# Patient Record
Sex: MALE | Race: WHITE
[De-identification: names, ages, dates, MRNs, and addresses within clinical notes are randomized per-mention and may not be internally consistent; named-entity substitution may affect disease eponyms.]

---

## 2019-06-01 PROBLEM — Z00.00 ENCOUNTER FOR PREVENTIVE HEALTH EXAMINATION: Status: ACTIVE | Noted: 2019-06-01

## 2019-06-06 ENCOUNTER — RECORD ABSTRACTING (OUTPATIENT)
Age: 49
End: 2019-06-06

## 2019-06-06 DIAGNOSIS — Z78.9 OTHER SPECIFIED HEALTH STATUS: ICD-10-CM

## 2019-06-06 DIAGNOSIS — Z83.79 FAMILY HISTORY OF OTHER DISEASES OF THE DIGESTIVE SYSTEM: ICD-10-CM

## 2019-06-06 DIAGNOSIS — Z83.1 FAMILY HISTORY OF OTHER INFECTIOUS AND PARASITIC DISEASES: ICD-10-CM

## 2019-06-06 RX ORDER — MULTIVITAMIN
TABLET ORAL
Refills: 0 | Status: ACTIVE | COMMUNITY

## 2019-06-26 ENCOUNTER — APPOINTMENT (OUTPATIENT)
Dept: GASTROENTEROLOGY | Facility: CLINIC | Age: 49
End: 2019-06-26
Payer: COMMERCIAL

## 2019-06-26 VITALS
WEIGHT: 228 LBS | SYSTOLIC BLOOD PRESSURE: 148 MMHG | BODY MASS INDEX: 33.77 KG/M2 | DIASTOLIC BLOOD PRESSURE: 100 MMHG | HEIGHT: 69 IN | HEART RATE: 81 BPM

## 2019-06-26 DIAGNOSIS — Z83.1 FAMILY HISTORY OF OTHER INFECTIOUS AND PARASITIC DISEASES: ICD-10-CM

## 2019-06-26 DIAGNOSIS — Z78.9 OTHER SPECIFIED HEALTH STATUS: ICD-10-CM

## 2019-06-26 PROCEDURE — 99214 OFFICE O/P EST MOD 30 MIN: CPT

## 2019-06-26 NOTE — CONSULT LETTER
[Dear  ___] : Dear  [unfilled], [Consult Letter:] : I had the pleasure of evaluating your patient, [unfilled]. [Please see my note below.] : Please see my note below. [Consult Closing:] : Thank you very much for allowing me to participate in the care of this patient.  If you have any questions, please do not hesitate to contact me. [Sincerely,] : Sincerely, [FreeTextEntry3] : Glen Gresham MD

## 2019-06-26 NOTE — REVIEW OF SYSTEMS
[Loss Of Hearing] : hearing loss [As Noted in HPI] : as noted in HPI [Negative] : Heme/Lymph [FreeTextEntry2] : nigh sweat [FreeTextEntry8] : frequent urination [FreeTextEntry3] : glasses

## 2019-06-26 NOTE — HISTORY OF PRESENT ILLNESS
[de-identified] : 48 year old male who I have seen in the past for dysphagia, heartburn and constipation\par Patient reports heartburn after dietary indiscretion (onion rings, french fries). This occurs approximately once a month. No dysphagia or odynophagia. \par Constipation has not been an issue for several years.\par \par The patient denies rectal bleeding, melena, diarrhea, constipation, change in bowel habits, change in stool caliber, weight loss,change in appetite, nausea, vomiting, difficulty swallowing, early satiety, abdominal pain, fever or chills.\par

## 2019-06-26 NOTE — ASSESSMENT
[FreeTextEntry1] : 48 year old male\par 1) Heartburn:\par  I educated the patient regarding lifestyle modification. \par I refilled prilosec 20mg daily\par Risk of long term proton pump inhibitors discussed with patient. The patient was advised to follow up with their primary care physician regarding bone density scans. I advised the patient to take calcium and vitamin D supplementation.\par \par 2) Constipation\par controlled\par high fiber diet\par \par 3) colorectal cancer screening\par Colonoscopy at age 50\par \par OV in 1 year

## 2019-07-01 ENCOUNTER — APPOINTMENT (OUTPATIENT)
Dept: GASTROENTEROLOGY | Facility: CLINIC | Age: 49
End: 2019-07-01

## 2021-01-05 ENCOUNTER — APPOINTMENT (OUTPATIENT)
Dept: GASTROENTEROLOGY | Facility: CLINIC | Age: 51
End: 2021-01-05
Payer: COMMERCIAL

## 2021-01-05 ENCOUNTER — TRANSCRIPTION ENCOUNTER (OUTPATIENT)
Age: 51
End: 2021-01-05

## 2021-01-05 DIAGNOSIS — Z12.11 ENCOUNTER FOR SCREENING FOR MALIGNANT NEOPLASM OF COLON: ICD-10-CM

## 2021-01-05 DIAGNOSIS — K21.9 GASTRO-ESOPHAGEAL REFLUX DISEASE W/OUT ESOPHAGITIS: ICD-10-CM

## 2021-01-05 DIAGNOSIS — Z12.12 ENCOUNTER FOR SCREENING FOR MALIGNANT NEOPLASM OF COLON: ICD-10-CM

## 2021-01-05 DIAGNOSIS — Z87.19 PERSONAL HISTORY OF OTHER DISEASES OF THE DIGESTIVE SYSTEM: ICD-10-CM

## 2021-01-05 PROCEDURE — 99443: CPT

## 2021-01-05 RX ORDER — OMEPRAZOLE 20 MG/1
20 TABLET, DELAYED RELEASE ORAL
Qty: 90 | Refills: 3 | Status: DISCONTINUED | COMMUNITY
End: 2021-01-05

## 2021-01-05 RX ORDER — ALPRAZOLAM 0.5 MG/1
0.5 TABLET ORAL
Qty: 30 | Refills: 0 | Status: DISCONTINUED | COMMUNITY
Start: 2019-06-19 | End: 2021-01-05

## 2021-01-05 RX ORDER — OMEPRAZOLE 40 MG/1
40 CAPSULE, DELAYED RELEASE ORAL
Qty: 1 | Refills: 3 | Status: ACTIVE | COMMUNITY
Start: 2020-02-11 | End: 1900-01-01

## 2021-01-05 NOTE — REVIEW OF SYSTEMS
[As Noted in HPI] : as noted in HPI [Fever] : no fever [Eye Pain] : no eye pain [Earache] : no earache [Heart Rate Is Slow] : the heart rate was not slow [Shortness Of Breath] : no shortness of breath [Arthralgias] : no arthralgias [Skin Lesions] : no skin lesions [Convulsions] : no convulsions [Easy Bleeding] : no tendency for easy bleeding

## 2021-01-05 NOTE — HISTORY OF PRESENT ILLNESS
[Home] : at home, [unfilled] , at the time of the visit. [Other Location: e.g. Home (Enter Location, City,State)___] : at [unfilled] [Verbal consent obtained from patient] : the patient, [unfilled] [FreeTextEntry4] : Ruben Fletcher MA [de-identified] : 50 year old male who I have seen in the past for dysphagia, heartburn and constipation\par At his visit in June 2019, the patient reported heartburn after dietary indiscretion (onion rings, french fries). This occurred approximately once a month. No dysphagia or odynophagia. \par Constipation has not been an issue for several years.\par \par He can have symptoms when he doesn't eat, he will experience dyspepsia. Overall though his symptoms are controlled.\par \par The patient denies rectal bleeding, melena, diarrhea, constipation, change in bowel habits, change in stool caliber, weight loss,change in appetite, nausea, vomiting, difficulty swallowing, early satiety, abdominal pain, fever or chills.

## 2021-01-05 NOTE — ASSESSMENT
[FreeTextEntry1] : 50 year old male\par \par 1) Heartburn:\par  I educated the patient regarding lifestyle modification. \par I refilled prilosec 20mg daily\par Risk of long term proton pump inhibitors discussed with patient. The patient was advised to follow up with their primary care physician regarding bone density scans. I advised the patient to take calcium and vitamin D supplementation.\par \par 2) Constipation\par controlled\par high fiber diet\par \par 3) colorectal cancer screening\par The patient will benefit from a colonoscopy. Risks, benefits, indications and alternatives discussed with patient who was amenable.\par \par The patient was given written instructions regarding the preparation. The day before the procedure only clear  liquids can be ingested. The patient will mix 238 grams of MiraLax mixed with 64 ounces gatorade. The patient was instructed to drink half  the mixture beginning at 5 pm the day before the colonoscopy and complete it before midnight. The other half should be ingested the morning of the procedure (making sure to complete it at least 2 hours before the scheduled procedure time).  The patient was also advised to take 20 mg oral Dulcolax pills the evening before the procedure. Nothing should be eaten after midnight. The patient was told how to manage their medications the day before and the day of the procedure.\par \par

## 2021-01-17 ENCOUNTER — OUTPATIENT (OUTPATIENT)
Dept: OUTPATIENT SERVICES | Facility: HOSPITAL | Age: 51
LOS: 1 days | Discharge: HOME | End: 2021-01-17

## 2021-01-17 ENCOUNTER — LABORATORY RESULT (OUTPATIENT)
Age: 51
End: 2021-01-17

## 2021-01-17 DIAGNOSIS — Z11.59 ENCOUNTER FOR SCREENING FOR OTHER VIRAL DISEASES: ICD-10-CM

## 2021-01-20 ENCOUNTER — TRANSCRIPTION ENCOUNTER (OUTPATIENT)
Age: 51
End: 2021-01-20

## 2021-01-20 ENCOUNTER — OUTPATIENT (OUTPATIENT)
Dept: OUTPATIENT SERVICES | Facility: HOSPITAL | Age: 51
LOS: 1 days | Discharge: HOME | End: 2021-01-20
Payer: COMMERCIAL

## 2021-01-20 ENCOUNTER — RESULT REVIEW (OUTPATIENT)
Age: 51
End: 2021-01-20

## 2021-01-20 VITALS
TEMPERATURE: 98 F | RESPIRATION RATE: 18 BRPM | HEIGHT: 69 IN | SYSTOLIC BLOOD PRESSURE: 149 MMHG | DIASTOLIC BLOOD PRESSURE: 95 MMHG | WEIGHT: 220.02 LBS

## 2021-01-20 VITALS — SYSTOLIC BLOOD PRESSURE: 117 MMHG | HEART RATE: 86 BPM | DIASTOLIC BLOOD PRESSURE: 69 MMHG | RESPIRATION RATE: 18 BRPM

## 2021-01-20 DIAGNOSIS — Z90.49 ACQUIRED ABSENCE OF OTHER SPECIFIED PARTS OF DIGESTIVE TRACT: Chronic | ICD-10-CM

## 2021-01-20 DIAGNOSIS — Z90.89 ACQUIRED ABSENCE OF OTHER ORGANS: Chronic | ICD-10-CM

## 2021-01-20 PROCEDURE — 88305 TISSUE EXAM BY PATHOLOGIST: CPT | Mod: 26

## 2021-01-20 PROCEDURE — 45380 COLONOSCOPY AND BIOPSY: CPT

## 2021-01-20 RX ORDER — OMEPRAZOLE 10 MG/1
1 CAPSULE, DELAYED RELEASE ORAL
Qty: 0 | Refills: 0 | DISCHARGE

## 2021-01-20 NOTE — CHART NOTE - NSCHARTNOTEFT_GEN_A_CORE
PACU ANESTHESIA ADMISSION NOTE      Procedure: colonoscopy  Post op diagnosis:        __x__  Patent Airway      __x__  Full recovery from anesthesia / back to baseline     Vitals:      R:         16         BP:       142/75           Sat:      98%             P:  92      Mental Status:  __x__ Awake   _____ Alert   _____ Drowsy   _____ Sedated    Nausea/Vomiting:  __x__ NO  ______Yes,   See Post - Op Orders          Pain Scale (0-10):  _____    Treatment: ____ None    ___x_ See Post - Op/PCA Orders    Post - Operative Fluids:   ____ Oral   __x__ See Post - Op Orders    Plan: Discharge:   __x__Home       _____Floor     _____Critical Care    _____  Other:_________________    Comments:  Uneventful intraoperative course. No anesthesia issues or complications noted. Patient stable upon arrival to PACU. Report given to RN. Discharge when criteria met.

## 2021-01-22 LAB — SURGICAL PATHOLOGY STUDY: SIGNIFICANT CHANGE UP

## 2021-01-25 DIAGNOSIS — K63.5 POLYP OF COLON: ICD-10-CM

## 2021-01-25 DIAGNOSIS — K21.00 GASTRO-ESOPHAGEAL REFLUX DISEASE WITH ESOPHAGITIS, WITHOUT BLEEDING: ICD-10-CM

## 2021-01-25 DIAGNOSIS — K62.1 RECTAL POLYP: ICD-10-CM

## 2021-01-25 DIAGNOSIS — Z12.11 ENCOUNTER FOR SCREENING FOR MALIGNANT NEOPLASM OF COLON: ICD-10-CM

## 2021-01-25 DIAGNOSIS — K64.4 RESIDUAL HEMORRHOIDAL SKIN TAGS: ICD-10-CM

## 2021-01-25 DIAGNOSIS — K64.8 OTHER HEMORRHOIDS: ICD-10-CM

## 2021-09-09 NOTE — ASU PREOP CHECKLIST - SITE MARKED BY SURGEON
Right L3 (L3-4) JAY CPTCode: 45948,47422 -APPROVED   Per Medicare Guidelines: Request is a covered benefit and pre-certification is not require. All Medicare coverage is based on Medical Necessity.     Notified ALBERTO Approved Referrals for scheduling n/a

## 2023-02-10 NOTE — ASU PATIENT PROFILE, ADULT - AS SC BRADEN ACTIVITY
I saw and evaluated the patient, performing the key elements of the service. I discussed the findings, assessment and plan with the resident and agree with the resident's findings and plan as documented in the resident's note. (4) walks frequently

## 2023-11-10 PROBLEM — K21.00 GASTRO-ESOPHAGEAL REFLUX DISEASE WITH ESOPHAGITIS, WITHOUT BLEEDING: Chronic | Status: ACTIVE | Noted: 2021-01-20

## 2023-11-22 ENCOUNTER — RESULT REVIEW (OUTPATIENT)
Age: 53
End: 2023-11-22

## 2023-11-22 ENCOUNTER — OUTPATIENT (OUTPATIENT)
Dept: OUTPATIENT SERVICES | Facility: HOSPITAL | Age: 53
LOS: 1 days | End: 2023-11-22
Payer: COMMERCIAL

## 2023-11-22 DIAGNOSIS — Z90.49 ACQUIRED ABSENCE OF OTHER SPECIFIED PARTS OF DIGESTIVE TRACT: Chronic | ICD-10-CM

## 2023-11-22 DIAGNOSIS — Z90.89 ACQUIRED ABSENCE OF OTHER ORGANS: Chronic | ICD-10-CM

## 2023-11-22 DIAGNOSIS — Z00.8 ENCOUNTER FOR OTHER GENERAL EXAMINATION: ICD-10-CM

## 2023-11-22 DIAGNOSIS — R35.0 FREQUENCY OF MICTURITION: ICD-10-CM

## 2023-11-22 DIAGNOSIS — R94.5 ABNORMAL RESULTS OF LIVER FUNCTION STUDIES: ICD-10-CM

## 2023-11-22 PROCEDURE — 76700 US EXAM ABDOM COMPLETE: CPT | Mod: 26

## 2023-11-22 PROCEDURE — 76857 US EXAM PELVIC LIMITED: CPT | Mod: 26

## 2023-11-22 PROCEDURE — 76857 US EXAM PELVIC LIMITED: CPT

## 2023-11-22 PROCEDURE — 76700 US EXAM ABDOM COMPLETE: CPT

## 2023-11-23 DIAGNOSIS — R94.5 ABNORMAL RESULTS OF LIVER FUNCTION STUDIES: ICD-10-CM

## 2023-11-23 DIAGNOSIS — R35.0 FREQUENCY OF MICTURITION: ICD-10-CM
